# Patient Record
Sex: MALE | Race: OTHER | HISPANIC OR LATINO | URBAN - METROPOLITAN AREA
[De-identification: names, ages, dates, MRNs, and addresses within clinical notes are randomized per-mention and may not be internally consistent; named-entity substitution may affect disease eponyms.]

---

## 2018-08-11 ENCOUNTER — EMERGENCY (EMERGENCY)
Facility: HOSPITAL | Age: 53
LOS: 1 days | Discharge: HOME | End: 2018-08-11
Attending: EMERGENCY MEDICINE | Admitting: EMERGENCY MEDICINE
Payer: COMMERCIAL

## 2018-08-11 VITALS
DIASTOLIC BLOOD PRESSURE: 71 MMHG | TEMPERATURE: 98 F | OXYGEN SATURATION: 98 % | RESPIRATION RATE: 18 BRPM | SYSTOLIC BLOOD PRESSURE: 113 MMHG | HEART RATE: 63 BPM

## 2018-08-11 VITALS
TEMPERATURE: 98 F | WEIGHT: 179.9 LBS | OXYGEN SATURATION: 99 % | SYSTOLIC BLOOD PRESSURE: 135 MMHG | DIASTOLIC BLOOD PRESSURE: 80 MMHG | HEART RATE: 70 BPM | RESPIRATION RATE: 18 BRPM

## 2018-08-11 LAB
ALBUMIN SERPL ELPH-MCNC: 4.1 G/DL — SIGNIFICANT CHANGE UP (ref 3.3–5)
ALP SERPL-CCNC: 87 U/L — SIGNIFICANT CHANGE UP (ref 40–120)
ALT FLD-CCNC: 16 U/L — SIGNIFICANT CHANGE UP (ref 10–45)
ANION GAP SERPL CALC-SCNC: 13 MMOL/L — SIGNIFICANT CHANGE UP (ref 5–17)
AST SERPL-CCNC: 16 U/L — SIGNIFICANT CHANGE UP (ref 10–40)
BASOPHILS NFR BLD AUTO: 0.8 % — SIGNIFICANT CHANGE UP (ref 0–2)
BILIRUB SERPL-MCNC: 0.2 MG/DL — SIGNIFICANT CHANGE UP (ref 0.2–1.2)
BUN SERPL-MCNC: 16 MG/DL — SIGNIFICANT CHANGE UP (ref 7–23)
CALCIUM SERPL-MCNC: 9 MG/DL — SIGNIFICANT CHANGE UP (ref 8.4–10.5)
CHLORIDE SERPL-SCNC: 103 MMOL/L — SIGNIFICANT CHANGE UP (ref 96–108)
CHOLEST SERPL-MCNC: 222 MG/DL — HIGH (ref 10–199)
CK MB CFR SERPL CALC: 4.1 NG/ML — SIGNIFICANT CHANGE UP (ref 0–6.7)
CK SERPL-CCNC: 194 U/L — SIGNIFICANT CHANGE UP (ref 30–200)
CO2 SERPL-SCNC: 25 MMOL/L — SIGNIFICANT CHANGE UP (ref 22–31)
CREAT SERPL-MCNC: 0.93 MG/DL — SIGNIFICANT CHANGE UP (ref 0.5–1.3)
EOSINOPHIL NFR BLD AUTO: 3.5 % — SIGNIFICANT CHANGE UP (ref 0–6)
GLUCOSE SERPL-MCNC: 89 MG/DL — SIGNIFICANT CHANGE UP (ref 70–99)
HCT VFR BLD CALC: 45.7 % — SIGNIFICANT CHANGE UP (ref 39–50)
HDLC SERPL-MCNC: 37 MG/DL — LOW (ref 40–125)
HGB BLD-MCNC: 15.6 G/DL — SIGNIFICANT CHANGE UP (ref 13–17)
LIPID PNL WITH DIRECT LDL SERPL: 144 MG/DL — HIGH
LYMPHOCYTES # BLD AUTO: 19.8 % — SIGNIFICANT CHANGE UP (ref 13–44)
MCHC RBC-ENTMCNC: 30.2 PG — SIGNIFICANT CHANGE UP (ref 27–34)
MCHC RBC-ENTMCNC: 34.1 G/DL — SIGNIFICANT CHANGE UP (ref 32–36)
MCV RBC AUTO: 88.6 FL — SIGNIFICANT CHANGE UP (ref 80–100)
MONOCYTES NFR BLD AUTO: 8.4 % — SIGNIFICANT CHANGE UP (ref 2–14)
NEUTROPHILS NFR BLD AUTO: 67.5 % — SIGNIFICANT CHANGE UP (ref 43–77)
PLATELET # BLD AUTO: 254 K/UL — SIGNIFICANT CHANGE UP (ref 150–400)
POTASSIUM SERPL-MCNC: 4.2 MMOL/L — SIGNIFICANT CHANGE UP (ref 3.5–5.3)
POTASSIUM SERPL-SCNC: 4.2 MMOL/L — SIGNIFICANT CHANGE UP (ref 3.5–5.3)
PROT SERPL-MCNC: 6.9 G/DL — SIGNIFICANT CHANGE UP (ref 6–8.3)
RBC # BLD: 5.16 M/UL — SIGNIFICANT CHANGE UP (ref 4.2–5.8)
RBC # FLD: 13.4 % — SIGNIFICANT CHANGE UP (ref 10.3–16.9)
SODIUM SERPL-SCNC: 141 MMOL/L — SIGNIFICANT CHANGE UP (ref 135–145)
TOTAL CHOLESTEROL/HDL RATIO MEASUREMENT: 6 RATIO — SIGNIFICANT CHANGE UP (ref 3.4–9.6)
TRIGL SERPL-MCNC: 206 MG/DL — HIGH (ref 10–149)
TROPONIN T SERPL-MCNC: <0.01 NG/ML — SIGNIFICANT CHANGE UP (ref 0–0.01)
WBC # BLD: 6.5 K/UL — SIGNIFICANT CHANGE UP (ref 3.8–10.5)
WBC # FLD AUTO: 6.5 K/UL — SIGNIFICANT CHANGE UP (ref 3.8–10.5)

## 2018-08-11 PROCEDURE — 85025 COMPLETE CBC W/AUTO DIFF WBC: CPT

## 2018-08-11 PROCEDURE — 82553 CREATINE MB FRACTION: CPT

## 2018-08-11 PROCEDURE — 99284 EMERGENCY DEPT VISIT MOD MDM: CPT | Mod: 25

## 2018-08-11 PROCEDURE — 93010 ELECTROCARDIOGRAM REPORT: CPT

## 2018-08-11 PROCEDURE — 82550 ASSAY OF CK (CPK): CPT

## 2018-08-11 PROCEDURE — 70450 CT HEAD/BRAIN W/O DYE: CPT

## 2018-08-11 PROCEDURE — 93005 ELECTROCARDIOGRAM TRACING: CPT

## 2018-08-11 PROCEDURE — 70450 CT HEAD/BRAIN W/O DYE: CPT | Mod: 26,59

## 2018-08-11 PROCEDURE — 80053 COMPREHEN METABOLIC PANEL: CPT

## 2018-08-11 PROCEDURE — 70496 CT ANGIOGRAPHY HEAD: CPT

## 2018-08-11 PROCEDURE — 80061 LIPID PANEL: CPT

## 2018-08-11 PROCEDURE — 70498 CT ANGIOGRAPHY NECK: CPT | Mod: 26

## 2018-08-11 PROCEDURE — 70498 CT ANGIOGRAPHY NECK: CPT

## 2018-08-11 PROCEDURE — 84484 ASSAY OF TROPONIN QUANT: CPT

## 2018-08-11 PROCEDURE — 70496 CT ANGIOGRAPHY HEAD: CPT | Mod: 26

## 2018-08-11 PROCEDURE — 82962 GLUCOSE BLOOD TEST: CPT

## 2018-08-11 RX ORDER — MECLIZINE HCL 12.5 MG
25 TABLET ORAL ONCE
Qty: 0 | Refills: 0 | Status: COMPLETED | OUTPATIENT
Start: 2018-08-11 | End: 2018-08-11

## 2018-08-11 RX ORDER — MECLIZINE HCL 12.5 MG
1 TABLET ORAL
Qty: 21 | Refills: 0 | OUTPATIENT
Start: 2018-08-11 | End: 2018-08-17

## 2018-08-11 RX ORDER — SODIUM CHLORIDE 9 MG/ML
1000 INJECTION INTRAMUSCULAR; INTRAVENOUS; SUBCUTANEOUS ONCE
Qty: 0 | Refills: 0 | Status: COMPLETED | OUTPATIENT
Start: 2018-08-11 | End: 2018-08-11

## 2018-08-11 RX ADMIN — Medication 25 MILLIGRAM(S): at 10:30

## 2018-08-11 RX ADMIN — SODIUM CHLORIDE 1000 MILLILITER(S): 9 INJECTION INTRAMUSCULAR; INTRAVENOUS; SUBCUTANEOUS at 10:30

## 2018-08-11 NOTE — ED ADULT NURSE NOTE - CHPI ED NUR SYMPTOMS NEG
no change in level of consciousness/no fever/no weakness/no blurred vision/no confusion/no vomiting/no loss of consciousness/no nausea/no numbness

## 2018-08-11 NOTE — ED PROVIDER NOTE - MEDICAL DECISION MAKING DETAILS
dizziness- noted nystagmus  right sided- neuro intact otherwise  improved with meclizine fluids - hx of elev chol rec to see ur pmd for consideration for medication - nl  gait  small BERNADETTE  micronodule rec pulm follow up in 1 week

## 2018-08-11 NOTE — ED ADULT TRIAGE NOTE - OTHER COMPLAINTS
C/o generalized weakness. No facial drooping or slurred speech noted. Ambulating with steady gait. Fingerstick 128 in triage.

## 2018-08-11 NOTE — ED ADULT NURSE NOTE - OBJECTIVE STATEMENT
Pt reports intermittent dizziness, head pressure, right eye "tickle" for 1 week, reports taking viagra intermittently for the last 3 weeks. Pt denies chest pain, sob, weakness, n/v, or any other symptoms.

## 2018-08-11 NOTE — ED PROVIDER NOTE - CRANIAL NERVE AND PUPILLARY EXAM
central vision intact/extra-ocular movements intact/cranial nerves 2-12 intact/corneal reflex intact/peripheral vision intact/gag reflex intact/tongue is midline/left sided nystagmus -pos left james hallpike peripheral vision intact/tongue is midline/corneal reflex intact/right-sided nystagmus -pos right james hallpike/cranial nerves 2-12 intact/central vision intact/extra-ocular movements intact/gag reflex intact

## 2018-08-11 NOTE — ED PROVIDER NOTE - PROGRESS NOTE DETAILS
pt feeling better with meclizine  await Ct  elevated cholesterol  - pt has prior known hx of elevated chol  no medications in past

## 2018-08-11 NOTE — ED ADULT NURSE NOTE - NSIMPLEMENTINTERV_GEN_ALL_ED
Implemented All Fall Risk Interventions:  [place] to call system. Call bell, personal items and telephone within reach. Instruct patient to call for assistance. Room bathroom lighting operational. Non-slip footwear when patient is off stretcher. Physically safe environment: no spills, clutter or unnecessary equipment. Stretcher in lowest position, wheels locked, appropriate side rails in place. Provide visual cue, wrist band, yellow gown, etc. Monitor gait and stability. Monitor for mental status changes and reorient to person, place, and time. Review medications for side effects contributing to fall risk. Reinforce activity limits and safety measures with patient and family.

## 2018-08-11 NOTE — ED PROVIDER NOTE - OBJECTIVE STATEMENT
54 yo male with no sig PMH bibems from [facility] with dizziness and feeling like room spinning - worse with head movement -recent trip to [place] 3 weeks ago- now back x 1 week- started using Viagra 3 weeks ago - since then feeling eye tickling dizziness vague headaches no neck pain- no visual changes- states that in the past week his dizziness has been increasing -worse in the past 1-2 days  no diff walking- nl gait feels like room spinning - no prior hx of vertigo in the past- Dad had CVA - age 78 apparently fatal-  pt denies any chest pain or sob  no N/V  no diaphoresis  no syncope or palpitations

## 2018-08-11 NOTE — ED PROVIDER NOTE - CARE PLAN
Principal Discharge DX:	Dizziness Principal Discharge DX:	Dizziness  Secondary Diagnosis:	Nodule of left lung

## 2018-08-15 DIAGNOSIS — R91.1 SOLITARY PULMONARY NODULE: ICD-10-CM

## 2018-08-15 DIAGNOSIS — R42 DIZZINESS AND GIDDINESS: ICD-10-CM

## 2020-01-03 NOTE — ED ADULT NURSE NOTE - RN DISCHARGE SIGNATURE
Quality 130: Documentation Of Current Medications In The Medical Record: Current Medications Documented Detail Level: Detailed Quality 402: Tobacco Use And Help With Quitting Among Adolescents: Patient screened for tobacco and never smoked Additional Notes: 2019 not sure what month pneumonia vaccine done Quality 111:Pneumonia Vaccination Status For Older Adults: Pneumococcal Vaccination Previously Received 11-Aug-2018